# Patient Record
Sex: MALE | Race: WHITE | NOT HISPANIC OR LATINO | Employment: OTHER | URBAN - METROPOLITAN AREA
[De-identification: names, ages, dates, MRNs, and addresses within clinical notes are randomized per-mention and may not be internally consistent; named-entity substitution may affect disease eponyms.]

---

## 2021-09-01 ENCOUNTER — CLINICAL SUPPORT (OUTPATIENT)
Dept: DENTISTRY | Facility: CLINIC | Age: 36
End: 2021-09-01

## 2021-09-01 VITALS — SYSTOLIC BLOOD PRESSURE: 136 MMHG | TEMPERATURE: 96.6 F | HEART RATE: 94 BPM | DIASTOLIC BLOOD PRESSURE: 89 MMHG

## 2021-09-01 DIAGNOSIS — K02.9 DENTAL CARIES: ICD-10-CM

## 2021-09-01 DIAGNOSIS — K03.6 DENTAL CALCULUS: ICD-10-CM

## 2021-09-01 DIAGNOSIS — K03.6 ACCRETIONS ON TEETH: ICD-10-CM

## 2021-09-01 DIAGNOSIS — Z01.20 ENCOUNTER FOR DENTAL EXAMINATION: Primary | ICD-10-CM

## 2021-09-01 PROCEDURE — D1110 PROPHYLAXIS - ADULT: HCPCS

## 2021-09-01 PROCEDURE — D0120 PERIODIC ORAL EVALUATION - ESTABLISHED PATIENT: HCPCS | Performed by: DENTIST

## 2021-09-01 NOTE — PROGRESS NOTES
Prophy    Dental procedures in this visit     - PROPHYLAXIS - ADULT (Completed)     Perio charting     Service provider: Denise Grove [facility], [address] [place] [address]     Billing provider: Rosey Larios DMD     - PERIODIC ORAL EVALUATION - ESTABLISHED PATIENT (Completed)     Service provider: Rosey Larios DMD     Billing provider: Rosey Larios DMD       Method Used:  · Prophy Method Used: Hand Scaling  · Polished  · Flossed    Radiographs Taken:  · None    Intra/Extra Oral Cancer Screening:  · Within normal limits    Orthodontic Screening:  · Not performed    Oral Hygiene:  · Fair    Plaque:  · Generalized  · Light  · Moderate    Calculus:  · Generalized  · Light  · sl  IP calc   stressed IP flossing    Bleeding:  Bleeding on probing: No periodontal exam for this visit  · Light    Stain:  · Light    Periodontal Charting:  · Full probing    Periodontal Classification:  · Gingivitis    Periodontitis Staging/Grading:  · Stagin  · Grading:  A    Stressed brushing 2 x day   Discussed [facility]  He uses floss picks, but needs to clean IP better    Grinding and some erosion noted especially UL  # 12 decay forming on distal  Ck  # 15 M  # 28 occlusal pit  Smooth # 4 at wilfrid appt  NV1:  wilfrid # 12DO  , 28 pit  Smooth # 4 O  NV2  6 mos recall BWX  NV3   impressions if patient consents    He will try finding OTC one again        No orders of the defined types were placed in this encounter

## 2021-11-10 ENCOUNTER — OFFICE VISIT (OUTPATIENT)
Dept: DENTISTRY | Facility: CLINIC | Age: 36
End: 2021-11-10

## 2021-11-10 VITALS — DIASTOLIC BLOOD PRESSURE: 88 MMHG | HEART RATE: 94 BPM | SYSTOLIC BLOOD PRESSURE: 126 MMHG | TEMPERATURE: 96.4 F

## 2021-11-10 DIAGNOSIS — K02.62 DENTAL CARIES EXTENDING INTO DENTIN: ICD-10-CM

## 2021-11-10 DIAGNOSIS — K02.61 DENTAL CARIES ON SMOOTH SURFACE LIMITED TO ENAMEL: Primary | ICD-10-CM

## 2021-11-10 PROCEDURE — D2391 RESIN-BASED COMPOSITE - 1 SURFACE, POSTERIOR: HCPCS | Performed by: DENTIST

## 2022-03-07 ENCOUNTER — CLINICAL SUPPORT (OUTPATIENT)
Dept: DENTISTRY | Facility: CLINIC | Age: 37
End: 2022-03-07

## 2022-03-07 VITALS — DIASTOLIC BLOOD PRESSURE: 87 MMHG | HEART RATE: 92 BPM | TEMPERATURE: 97.6 F | SYSTOLIC BLOOD PRESSURE: 123 MMHG

## 2022-03-07 DIAGNOSIS — K03.6 ACCRETIONS ON TEETH: ICD-10-CM

## 2022-03-07 DIAGNOSIS — Z01.20 ENCOUNTER FOR DENTAL EXAMINATION: Primary | ICD-10-CM

## 2022-03-07 DIAGNOSIS — K03.6 DENTAL CALCULUS: ICD-10-CM

## 2022-03-07 PROCEDURE — D0274 BITEWINGS - 4 RADIOGRAPHIC IMAGES: HCPCS

## 2022-03-07 PROCEDURE — D1110 PROPHYLAXIS - ADULT: HCPCS

## 2022-03-07 PROCEDURE — D0120 PERIODIC ORAL EVALUATION - ESTABLISHED PATIENT: HCPCS

## 2022-03-07 RX ORDER — POLYETHYLENE GLYCOL 3350 17 G/17G
17 POWDER, FOR SOLUTION ORAL
COMMUNITY

## 2022-03-07 RX ORDER — SIMVASTATIN 20 MG
1 TABLET ORAL DAILY
COMMUNITY
Start: 2022-02-05

## 2022-03-07 RX ORDER — LOSARTAN POTASSIUM AND HYDROCHLOROTHIAZIDE 12.5; 1 MG/1; MG/1
1 TABLET ORAL DAILY
COMMUNITY
Start: 2022-02-07

## 2022-03-07 RX ORDER — AMLODIPINE BESYLATE 5 MG/1
1 TABLET ORAL DAILY
COMMUNITY
Start: 2022-02-15

## 2022-03-07 RX ORDER — METOPROLOL SUCCINATE 50 MG/1
1 TABLET, EXTENDED RELEASE ORAL DAILY
COMMUNITY
Start: 2022-02-02

## 2022-03-07 RX ORDER — LORATADINE 10 MG/1
10 TABLET ORAL
COMMUNITY

## 2022-03-07 NOTE — PROGRESS NOTES
Prophy    Dental procedures in this visit     - PERIODIC ORAL EVALUATION - ESTABLISHED PATIENT (Completed)     Service provider: John May, [address] [facility]     Billing provider: Shadia Mcgraw DMD     - PROPHYLAXIS - ADULT (Completed)     [facility] [address] [place] [address]     Billing provider: JACQUELINE Granados - BITEWINGS - 4 RADIOGRAPHIC IMAGES (Completed)     Ck # 12 DO     [facility] [address] [place] [address]     Billing provider: Shadia Mcgraw DMD       Method Used:  · Prophy Method Used: Ultrasonic Scaling  · Hand Scaling  · Polished  · Flossed    Radiographs Taken:  · Bitewings x4    Intra/Extra Oral Cancer Screening:  · Within normal limits    Oral Hygiene:  · Good    Plaque:  · Generalized  · Light    Calculus:  · Localized  · Light  · Moderate    Bleeding:  Bleeding on probing: No periodontal exam for this visit  · Light    Stain:  · Light    Periodontal Charting:  · Emmie Marie  Monitor # 12    Sub  Calc  Noted 30,31 and # 18,19 area    NV  6 mos recall  No decay charted today    No orders of the defined types were placed in this encounter